# Patient Record
(demographics unavailable — no encounter records)

---

## 2024-10-10 NOTE — ADDENDUM
[FreeTextEntry1] : 08/20/21: Mr. Goncalves has no active cardiovascular complaints. He had a normal stress test in 2018.  He may, therefore, proceed with surgery without cardiac contraindications. He may hold ASA for 5 days prior if required.

## 2024-10-10 NOTE — ASSESSMENT
[FreeTextEntry1] : ============================================================= 1. Aortic stenosis: severe, s/p AVR, 25 mm Magna Ease Bioprosthetic (06/16/16) (Marvin Eaton MD):              - will follow with periodic sonograms             - SBE prophylaxis per AHA guidelines.   2. CAD: 06/16/16: CABG x 2 (KARINE-LAD, SVG-PLOM)             - will pursue aggressive medical management.              - continue off ASA 81mg po qd given need for NSAID and > 5 years post CABG  3. Dyslipidemia: possible statin associated muscle symptoms on atorvastatin 80mg: LDL 69 (05/06/22)            - continue atorvastatin 40mg po qd             - discussed therapeutic lifestyle changes to promote improved lipid metabolism             - patient will provide copy of recent lab work from PMD's office for my review  4. Carotid stenosis: s/p carotid sono 11/05/20: sono: MARY 1-19%, LICA 20-49%.              - will follow with periodic sonograms   5. Osteoarthritis:             - continue celecoxib 200mg po qd (we discussed the risks of chronic NSAID use)            - would send to a rheumatologist but he declined  6. Diabetes type II:  A1c 6.5% (08/05/22):             - continue metformin 500mg 2 tabs po bid                   - discussed with patient therapeutic lifestyle changes to improve glucose metabolism            - follow up with PMD            - patient will provide copy of recent lab work from PMD's office for my review

## 2024-10-10 NOTE — HISTORY OF PRESENT ILLNESS
[FreeTextEntry1] : Mr. Goncalves presents for follow up and management of severe aortic stenosis and CAD s/p CABG x 2 (KARINE->LAD, SVG->PLOM), AVR bioprosthetic (#25 Magna Ease) (06/16/16), HTN, dyslipidemia, and DM2. He underwent AVR and CABG x 2 on 06/16/16 with Marvin Eaton MD without complication.  He was diagnosed with early stage bladder cancer in 08/2021 and is following at [facility] [place].  He will have BCG infusion in the near future.  On 11/01/21, he had an echocardiogram which revealed an EF of 70% , mild LVH, and a normally functioning bioprosthetic aortic valve.  He spends the winter in [place], [place].  On 01/09/22 he fell and broke his hip while on a boat [place]  He underwent surgical repair on 01/11/22.  On POD 1 (01/12/22) he experienced acute delirium.  He had marked blood loss and received 2 units of PRBCs.  He has recovered well since that time.  Today, 10/10/24, he had an echocardiogram which revealed an EF of 68% and a 25 mm Vikash-Singh Shaheed Ease bioprosthetic aortic valve (normally functioning). At present, he feels well.

## 2024-10-10 NOTE — HISTORY OF PRESENT ILLNESS
[FreeTextEntry1] : Mr. Goncalves presents for follow up and management of severe aortic stenosis and CAD s/p CABG x 2 (KARINE->LAD, SVG->PLOM), AVR bioprosthetic (#25 Magna Ease) (06/16/16), HTN, dyslipidemia, and DM2. He underwent AVR and CABG x 2 on 06/16/16 with Marvin Eaton MD without complication.  He was diagnosed with early stage bladder cancer in 08/2021 and is [place] [facility] [place].  He will have BCG infusion in the near future.  On 11/01/21, he had an echocardiogram which revealed an EF of 70% , mild LVH, and a normally functioning bioprosthetic aortic valve.  He spends the winter in [place], [place].  On 01/09/22 he fell and broke his hip while on a boat tour in [place]  He underwent surgical repair on 01/11/22.  On POD 1 (01/12/22) he experienced acute delirium.  He had marked blood loss and received 2 units of PRBCs.  He has recovered well since that time.  Today, 10/10/24, he had an echocardiogram which revealed an EF of 68% and a 25 mm Vikash-Singh Shaheed Ease bioprosthetic aortic valve (normally functioning). At present, he feels well.

## 2024-10-10 NOTE — PHYSICAL EXAM
[General Appearance - Well Developed] : well developed [Normal Appearance] : normal appearance [Well Groomed] : well groomed [General Appearance - Well Nourished] : well nourished [No Deformities] : no deformities [General Appearance - In No Acute Distress] : no acute distress [Normal Conjunctiva] : the conjunctiva exhibited no abnormalities [Eyelids - No Xanthelasma] : the eyelids demonstrated no xanthelasmas [Normal Oral Mucosa] : normal oral mucosa [No Oral Pallor] : no oral pallor [No Oral Cyanosis] : no oral cyanosis [Normal Jugular Venous A Waves Present] : normal jugular venous A waves present [Normal Jugular Venous V Waves Present] : normal jugular venous V waves present [No Jugular Venous Gallegos A Waves] : no jugular venous gallegos A waves [Respiration, Rhythm And Depth] : normal respiratory rhythm and effort [Exaggerated Use Of Accessory Muscles For Inspiration] : no accessory muscle use [Auscultation Breath Sounds / Voice Sounds] : lungs were clear to auscultation bilaterally [Heart Rate And Rhythm] : heart rate and rhythm were normal [Heart Sounds] : normal S1 and S2 [Edema] : no peripheral edema present [Abdomen Soft] : soft [Abdomen Tenderness] : non-tender [Abdomen Mass (___ Cm)] : no abdominal mass palpated [Abnormal Walk] : normal gait [Gait - Sufficient For Exercise Testing] : the gait was sufficient for exercise testing [Nail Clubbing] : no clubbing of the fingernails [Cyanosis, Localized] : no localized cyanosis [Petechial Hemorrhages (___cm)] : no petechial hemorrhages [Skin Color & Pigmentation] : normal skin color and pigmentation [] : no rash [No Venous Stasis] : no venous stasis [Skin Lesions] : no skin lesions [No Skin Ulcers] : no skin ulcer [No Xanthoma] : no  xanthoma was observed [Oriented To Time, Place, And Person] : oriented to person, place, and time [Affect] : the affect was normal [Mood] : the mood was normal [No Anxiety] : not feeling anxious [FreeTextEntry1] : + median sternotomy scar well healed, 1/6 THEA LUSB

## 2024-10-10 NOTE — REASON FOR VISIT
[Other: ____] : [unfilled] [FreeTextEntry1] : ======================================================= Diagnostic Tests: ----------------------------------------- ECG: 10/10/24: sinus rhythm, normal ECG.  08/07/23: sinus rhythm, normal ECG.  10/11/22: sinus rhythm, normal ECG.  08/23/21: sinus rhythm, normal ECG.  06/14/21: NSR, normal ECG.  10/26/20: NSR, normal ECG.  11/18/19: NSR, Q-wave III.  04/23/19: NSR, normal ECG.  07/03/18: NSR, normal ECG.  04/25/17: NSR, normal ECG.  06/08/16: NSR, normal ECG.  ----------------------------------------- Echo: 10/10/24: EF 68%, 25 mm Vikash-Singh Shaheed Ease bioprosthetic aortic valve (normally functioning).  08/03/23: EF 64%, small LV, mild LVH, mildly dilated RV, mildly reduced RV function, 25 mm Magna Ease bioprosthetic aortic valve (normally functioning), mild TR.  10/11/22: EF 67%, mild LVH, 25 mm Magna Ease bioprosthetic aortic valve (normally functioning). 11/01/21: EF 70%, mild LVH, bioprosthetic aortic valve normally functioning 11/05/20: EF 68%, mild LVH, bioprosthetic aortic valve normally functioning.  05/09/19: EF 71%, mild LVH, bioprosthetic aortic valve normally functioning.  06/22/16: EF 70%, LVH, reduced RV function, AVR 05/25/16: EF 60%, LVH, grade I diastolic dysfunction, severe AS, AFIA 0.6cm2, peak gradient 70mmHg, mean gradient 36mmHg ----------------------------------------- Stress: 07/06/18: exercise stress echo: EF 65%, bioprosthetic AVR normally functioning, dilated RV with reduced function, 7.3 METS, normal wall motion and PA pressures.  01/12/16: exercise stress echo: EF 67%, AFIA 0.8cm2, peak gradient 65mmHg, PASP 23mmHg which increased to 60mmHg post exercise, 7 METS, normal wall motion ------------------------------------------ Carotid: 11/05/20: sono: MARY 1-19%, LICA 20-49%. 05/09/19: sono: MARY 1-19%, LICA 20-49%.  06/28/16: b/l ICA 1-49%.

## 2025-07-08 NOTE — ASSESSMENT
[FreeTextEntry1] : ============================================================= 1. Aortic stenosis: severe, s/p AVR, 25 mm Magna Ease Bioprosthetic (06/16/16) (Marvin Eaton MD):   - will follow with periodic echocardiograms (ordered today)  - SBE prophylaxis per AHA guidelines.   2. CAD: 06/16/16: CABG x 2 (KARINE-LAD, SVG-PLOM)  - will pursue aggressive medical management.   - restart aspirin 81mgpo qd given off NSAIDS  3. Dyslipidemia: possible statin associated muscle symptoms on atorvastatin 80mg: LDL 69 (05/06/22)  - continue atorvastatin 40mg po qd   - discussed therapeutic lifestyle changes to promote improved lipid metabolism   - check lab work today  4. Carotid stenosis: s/p carotid sono 11/05/20: sono: MARY 1-19%, LICA 20-49%.   - will follow with periodic sonograms   5. Osteoarthritis:   - continue celecoxib 200mg po qd (we discussed the risks of chronic NSAID use)  - would send to a rheumatologist but he declined  6. Diabetes type II:  A1c 6.5% (08/05/22):   - continue metformin 500mg 2 tabs po bid         - discussed with patient therapeutic lifestyle changes to improve glucose metabolism  - follow up with PMD  - check lab work today

## 2025-07-08 NOTE — HISTORY OF PRESENT ILLNESS
[FreeTextEntry1] : Mr. Goncalves presents for follow up and management of severe aortic stenosis and CAD s/p CABG x 2 (KARINE->LAD, SVG->PLOM), AVR bioprosthetic (#25 Magna Ease) (06/16/16), HTN, dyslipidemia, and DM2. He underwent AVR and CABG x 2 on 06/16/16 with Marvin Eaton MD without complication.  He was diagnosed with early stage bladder cancer in 08/2021 and is following at [facility] [place].  He will have BCG infusion in the near future.  On 11/01/21, he had an echocardiogram which revealed an EF of 70% , mild LVH, and a normally functioning bioprosthetic aortic valve.  He spends the winter in [place], [place].  On 01/09/22 he fell and broke his hip while on a [place]  He underwent surgical repair on 01/11/22.  On POD 1 (01/12/22) he experienced acute delirium.  He had marked blood loss and received 2 units of PRBCs.  He has recovered well since that time.  On 10/10/24, he had an echocardiogram which revealed an EF of 68% and a 25 mm Vikash-Singh Magna Ease bioprosthetic aortic valve (normally functioning). At present, he denies chest pain and has DODSON to 2 flights of stairs.

## 2025-07-08 NOTE — PHYSICAL EXAM
[General Appearance - Well Developed] : well developed [Normal Appearance] : normal appearance [Well Groomed] : well groomed [General Appearance - Well Nourished] : well nourished [No Deformities] : no deformities [General Appearance - In No Acute Distress] : no acute distress [Normal Conjunctiva] : the conjunctiva exhibited no abnormalities [Eyelids - No Xanthelasma] : the eyelids demonstrated no xanthelasmas [Normal Oral Mucosa] : normal oral mucosa [No Oral Pallor] : no oral pallor [No Oral Cyanosis] : no oral cyanosis [Normal Jugular Venous A Waves Present] : normal jugular venous A waves present [Normal Jugular Venous V Waves Present] : normal jugular venous V waves present [No Jugular Venous Gallegos A Waves] : no jugular venous gallegos A waves [Exaggerated Use Of Accessory Muscles For Inspiration] : no accessory muscle use [Respiration, Rhythm And Depth] : normal respiratory rhythm and effort [Auscultation Breath Sounds / Voice Sounds] : lungs were clear to auscultation bilaterally [Heart Rate And Rhythm] : heart rate and rhythm were normal [Heart Sounds] : normal S1 and S2 [Edema] : no peripheral edema present [Abdomen Tenderness] : non-tender [Abdomen Soft] : soft [Abdomen Mass (___ Cm)] : no abdominal mass palpated [Abnormal Walk] : normal gait [Gait - Sufficient For Exercise Testing] : the gait was sufficient for exercise testing [Nail Clubbing] : no clubbing of the fingernails [Petechial Hemorrhages (___cm)] : no petechial hemorrhages [Cyanosis, Localized] : no localized cyanosis [Skin Color & Pigmentation] : normal skin color and pigmentation [] : no rash [No Venous Stasis] : no venous stasis [Skin Lesions] : no skin lesions [No Skin Ulcers] : no skin ulcer [No Xanthoma] : no  xanthoma was observed [Oriented To Time, Place, And Person] : oriented to person, place, and time [Affect] : the affect was normal [Mood] : the mood was normal [No Anxiety] : not feeling anxious [FreeTextEntry1] : + median sternotomy scar well healed, 1/6 THEA LUSB

## 2025-07-08 NOTE — HISTORY OF PRESENT ILLNESS
[FreeTextEntry1] : Mr. Goncalves presents for follow up and management of severe aortic stenosis and CAD s/p CABG x 2 (KARINE->LAD, SVG->PLOM), AVR bioprosthetic (#25 Magna Ease) (06/16/16), HTN, dyslipidemia, and DM2. He underwent AVR and CABG x 2 on 06/16/16 with Marvin Eaton MD without complication.  He was diagnosed with early stage bladder cancer in 08/2021 and is following at [facility] [place].  He will have BCG infusion in the near future.  On 11/01/21, he had an echocardiogram which revealed an EF of 70% , mild LVH, and a normally functioning bioprosthetic aortic valve.  He spends the winter in [place], [place].  On 01/09/22 he fell and broke his hip while on a boat [place]  He underwent surgical repair on 01/11/22.  On POD 1 (01/12/22) he experienced acute delirium.  He had marked blood loss and received 2 units of PRBCs.  He has recovered well since that time.  On 10/10/24, he had an echocardiogram which revealed an EF of 68% and a 25 mm Vikash-Singh Magna Ease bioprosthetic aortic valve (normally functioning). At present, he denies chest pain and has DODSON to 2 flights of stairs.

## 2025-07-08 NOTE — REASON FOR VISIT
[Other: ____] : [unfilled] [FreeTextEntry1] : ======================================================= Diagnostic Tests: ----------------------------------------- EC25: sinus rhythm, normal ECG.  10/10/24: sinus rhythm, normal ECG.  23: sinus rhythm, normal ECG.  10/11/22: sinus rhythm, normal ECG.  21: sinus rhythm, normal ECG.  21: NSR, normal ECG.  10/26/20: NSR, normal ECG.  19: NSR, Q-wave III.  19: NSR, normal ECG.  18: NSR, normal ECG.  17: NSR, normal ECG.  16: NSR, normal ECG.  ----------------------------------------- Echo: 10/10/24: EF 68%, 25 mm Viksah-Singh Shaheed Ease bioprosthetic aortic valve (normally functioning).  23: EF 64%, small LV, mild LVH, mildly dilated RV, mildly reduced RV function, 25 mm Magna Ease bioprosthetic aortic valve (normally functioning), mild TR.  10/11/22: EF 67%, mild LVH, 25 mm Magna Ease bioprosthetic aortic valve (normally functioning). 21: EF 70%, mild LVH, bioprosthetic aortic valve normally functioning 20: EF 68%, mild LVH, bioprosthetic aortic valve normally functioning.  19: EF 71%, mild LVH, bioprosthetic aortic valve normally functioning.  16: EF 70%, LVH, reduced RV function, AVR 16: EF 60%, LVH, grade I diastolic dysfunction, severe AS, AFIA 0.6cm2, peak gradient 70mmHg, mean gradient 36mmHg ----------------------------------------- Stress tests: 18: exercise stress echo: EF 65%, bioprosthetic AVR normally functioning, dilated RV with reduced function, 7.3 METS, normal wall motion and PA pressures.  16: exercise stress echo: EF 67%, AFIA 0.8cm2, peak gradient 65mmHg, PASP 23mmHg which increased to 60mmHg post exercise, 7 METS, normal wall motion ------------------------------------------ Carotid arteries: 20: sono: MARY 1-19%, LICA 20-49%. 19: sono: MARY 1-19%, LICA 20-49%.  16: b/l ICA 1-49%.

## 2025-07-08 NOTE — REASON FOR VISIT
[Other: ____] : [unfilled] [FreeTextEntry1] : ======================================================= Diagnostic Tests: ----------------------------------------- EC25: sinus rhythm, normal ECG.  10/10/24: sinus rhythm, normal ECG.  23: sinus rhythm, normal ECG.  10/11/22: sinus rhythm, normal ECG.  21: sinus rhythm, normal ECG.  21: NSR, normal ECG.  10/26/20: NSR, normal ECG.  19: NSR, Q-wave III.  19: NSR, normal ECG.  18: NSR, normal ECG.  17: NSR, normal ECG.  16: NSR, normal ECG.  ----------------------------------------- Echo: 10/10/24: EF 68%, 25 mm Vikash-Singh Shaheed Ease bioprosthetic aortic valve (normally functioning).  23: EF 64%, small LV, mild LVH, mildly dilated RV, mildly reduced RV function, 25 mm Magna Ease bioprosthetic aortic valve (normally functioning), mild TR.  10/11/22: EF 67%, mild LVH, 25 mm Magna Ease bioprosthetic aortic valve (normally functioning). 21: EF 70%, mild LVH, bioprosthetic aortic valve normally functioning 20: EF 68%, mild LVH, bioprosthetic aortic valve normally functioning.  19: EF 71%, mild LVH, bioprosthetic aortic valve normally functioning.  16: EF 70%, LVH, reduced RV function, AVR 16: EF 60%, LVH, grade I diastolic dysfunction, severe AS, AFIA 0.6cm2, peak gradient 70mmHg, mean gradient 36mmHg ----------------------------------------- Stress tests: 18: exercise stress echo: EF 65%, bioprosthetic AVR normally functioning, dilated RV with reduced function, 7.3 METS, normal wall motion and PA pressures.  16: exercise stress echo: EF 67%, AFIA 0.8cm2, peak gradient 65mmHg, PASP 23mmHg which increased to 60mmHg post exercise, 7 METS, normal wall motion ------------------------------------------ Carotid arteries: 20: sono: MARY 1-19%, LICA 20-49%. 19: sono: MARY 1-19%, LICA 20-49%.  16: b/l ICA 1-49%.